# Patient Record
Sex: MALE | Race: WHITE | NOT HISPANIC OR LATINO | Employment: FULL TIME | URBAN - METROPOLITAN AREA
[De-identification: names, ages, dates, MRNs, and addresses within clinical notes are randomized per-mention and may not be internally consistent; named-entity substitution may affect disease eponyms.]

---

## 2024-08-07 ENCOUNTER — OFFICE VISIT (OUTPATIENT)
Dept: PRIMARY CARE CLINIC | Facility: CLINIC | Age: 34
End: 2024-08-07
Payer: COMMERCIAL

## 2024-08-07 VITALS
WEIGHT: 192 LBS | TEMPERATURE: 97 F | RESPIRATION RATE: 16 BRPM | BODY MASS INDEX: 29.1 KG/M2 | HEART RATE: 65 BPM | HEIGHT: 68 IN | OXYGEN SATURATION: 99 % | SYSTOLIC BLOOD PRESSURE: 139 MMHG | DIASTOLIC BLOOD PRESSURE: 79 MMHG

## 2024-08-07 DIAGNOSIS — Z00.00 PREVENTATIVE HEALTH CARE: Primary | ICD-10-CM

## 2024-08-07 DIAGNOSIS — E29.1 HYPOGONADISM MALE: ICD-10-CM

## 2024-08-07 PROCEDURE — 1160F RVW MEDS BY RX/DR IN RCRD: CPT | Mod: CPTII,,, | Performed by: FAMILY MEDICINE

## 2024-08-07 PROCEDURE — 99385 PREV VISIT NEW AGE 18-39: CPT | Mod: ,,, | Performed by: FAMILY MEDICINE

## 2024-08-07 PROCEDURE — 3078F DIAST BP <80 MM HG: CPT | Mod: CPTII,,, | Performed by: FAMILY MEDICINE

## 2024-08-07 PROCEDURE — 3077F SYST BP >= 140 MM HG: CPT | Mod: CPTII,,, | Performed by: FAMILY MEDICINE

## 2024-08-07 PROCEDURE — 3008F BODY MASS INDEX DOCD: CPT | Mod: CPTII,,, | Performed by: FAMILY MEDICINE

## 2024-08-07 PROCEDURE — 1159F MED LIST DOCD IN RCRD: CPT | Mod: CPTII,,, | Performed by: FAMILY MEDICINE

## 2024-08-07 RX ORDER — DIPHENOXYLATE HYDROCHLORIDE AND ATROPINE SULFATE 2.5; .025 MG/5ML; MG/5ML
5 SOLUTION ORAL ONCE
COMMUNITY
Start: 2020-01-07

## 2024-08-07 RX ORDER — DICYCLOMINE HYDROCHLORIDE 10 MG/1
10 CAPSULE ORAL DAILY PRN
COMMUNITY

## 2024-08-07 RX ORDER — ELUXADOLINE 100 MG/1
100 TABLET, FILM COATED ORAL ONCE
COMMUNITY

## 2024-08-08 LAB
%TRANSFERRIN SATURATION: 26.6 % (ref 20–50)
ABS NRBC COUNT: 0 X 10 3/UL (ref 0–0.01)
ABSOLUTE BASOPHIL: 0.03 X 10 3/UL (ref 0–0.22)
ABSOLUTE EOSINOPHIL: 0.38 X 10 3/UL (ref 0.04–0.54)
ABSOLUTE IMMATURE GRAN: 0.01 X 10 3/UL (ref 0–0.04)
ABSOLUTE LYMPHOCYTE: 2.18 X 10 3/UL (ref 0.86–4.75)
ABSOLUTE MONOCYTE: 0.47 X 10 3/UL (ref 0.22–1.08)
ALBUMIN SERPL-MCNC: 4.6 G/DL (ref 3.5–5.2)
ALBUMIN/GLOB SERPL ELPH: 1.8 {RATIO} (ref 1–2.7)
ALP ISOS SERPL LEV INH-CCNC: 67 U/L (ref 40–130)
ALT (SGPT): 22 U/L (ref 0–41)
ANION GAP SERPL CALC-SCNC: 10 MMOL/L (ref 8–17)
AST SERPL-CCNC: 29 U/L (ref 0–40)
BASOPHILS NFR BLD: 0.5 % (ref 0.2–1.2)
BILIRUBIN, TOTAL: 0.92 MG/DL (ref 0–1.2)
BIOAVAILABLE TESTOSTERONE: ABNORMAL
BUN/CREAT SERPL: 21.4 (ref 6–20)
CALCIUM SERPL-MCNC: 9.7 MG/DL (ref 8.6–10.2)
CARBON DIOXIDE, CO2: 25 MMOL/L (ref 22–29)
CHLORIDE: 100 MMOL/L (ref 98–107)
CHOLEST SERPL-MSCNC: 224 MG/DL (ref 100–200)
CREAT SERPL-MCNC: 1.03 MG/DL (ref 0.7–1.2)
EOSINOPHIL NFR BLD: 6.6 % (ref 0.7–7)
ESTIMATED AVERAGE GLUCOSE: 101 MG/DL
FERRITIN: 72.6 NG/ML (ref 20–345)
FREE TESTOSTERONE: NORMAL
GFR ESTIMATION: 99.6 ML/MIN/1.73M2
GLOBULIN: 2.6 G/DL (ref 1.5–4.5)
GLUCOSE: 96 MG/DL (ref 74–106)
HBA1C MFR BLD: 5.1 % (ref 4–6)
HCT VFR BLD AUTO: 47.6 % (ref 42–52)
HDLC SERPL-MCNC: 56 MG/DL
HGB BLD-MCNC: 16.3 G/DL (ref 14–18)
IMMATURE GRANULOCYTES: 0.2 % (ref 0–0.5)
IRON BINDING CAPACITY: 369 UG/DL (ref 262–472)
IRON SERPL-MCNC: 98 UG/DL (ref 59–158)
LDL/HDL RATIO: 2.8 (ref 1–3)
LDLC SERPL CALC-MCNC: 154 MG/DL (ref 0–100)
LYMPHOCYTES NFR BLD: 37.8 % (ref 19.3–53.1)
MCH RBC QN AUTO: 29.3 PG (ref 27–32)
MCHC RBC AUTO-ENTMCNC: 34.2 G/DL (ref 32–36)
MCV RBC AUTO: 85.5 FL (ref 80–94)
MONOCYTES NFR BLD: 8.1 % (ref 4.7–12.5)
NEUTROPHILS # BLD AUTO: 2.7 X 10 3/UL (ref 2.15–7.56)
NEUTROPHILS NFR BLD: 46.8 % (ref 34–71.1)
NUCLEATED RED BLOOD CELLS: 0 /100 WBC (ref 0–0.2)
PLATELET # BLD AUTO: 235 X 10 3/UL (ref 135–400)
POTASSIUM: 4.7 MMOL/L (ref 3.5–5.1)
PROT SNV-MCNC: 7.2 G/DL (ref 6.4–8.3)
RBC # BLD AUTO: 5.57 X 10 6/UL (ref 4.7–6.1)
RDW-SD: 38.5 FL (ref 37–54)
SHBG: 40.2 NMOL/L (ref 16.5–55.9)
SODIUM: 135 MMOL/L (ref 136–145)
T4, FREE: 1.16 NG/DL (ref 0.93–1.7)
TESTOST SERPL-MCNC: >1500 NG/DL (ref 249–836)
TRIGL SERPL-MCNC: 70 MG/DL (ref 0–150)
TSH SERPL DL<=0.005 MIU/L-ACNC: 1.39 UIU/ML (ref 0.27–4.2)
UIBC SERPL-MCNC: 271 UG/DL (ref 112–346)
UREA NITROGEN (BUN): 22 MG/DL (ref 6–20)
WBC # BLD: 5.77 X 10 3/UL (ref 4.3–10.8)

## 2024-08-13 ENCOUNTER — PATIENT MESSAGE (OUTPATIENT)
Dept: PRIMARY CARE CLINIC | Facility: CLINIC | Age: 34
End: 2024-08-13
Payer: COMMERCIAL

## 2024-08-20 DIAGNOSIS — G47.33 OSA (OBSTRUCTIVE SLEEP APNEA): Primary | ICD-10-CM

## 2024-10-02 ENCOUNTER — OFFICE VISIT (OUTPATIENT)
Dept: PULMONOLOGY | Facility: CLINIC | Age: 34
End: 2024-10-02
Payer: COMMERCIAL

## 2024-10-02 VITALS
SYSTOLIC BLOOD PRESSURE: 133 MMHG | BODY MASS INDEX: 28.64 KG/M2 | HEIGHT: 68 IN | WEIGHT: 189 LBS | RESPIRATION RATE: 18 BRPM | HEART RATE: 66 BPM | DIASTOLIC BLOOD PRESSURE: 78 MMHG | OXYGEN SATURATION: 96 %

## 2024-10-02 DIAGNOSIS — R06.83 LOUD SNORING: ICD-10-CM

## 2024-10-02 DIAGNOSIS — F51.04 CHRONIC INSOMNIA: ICD-10-CM

## 2024-10-02 DIAGNOSIS — G47.19 EXCESSIVE DAYTIME SLEEPINESS: Primary | ICD-10-CM

## 2024-10-02 DIAGNOSIS — G47.33 OSA (OBSTRUCTIVE SLEEP APNEA): ICD-10-CM

## 2024-10-02 DIAGNOSIS — K58.9 IRRITABLE BOWEL SYNDROME, UNSPECIFIED TYPE: ICD-10-CM

## 2024-10-02 PROCEDURE — 1159F MED LIST DOCD IN RCRD: CPT | Mod: CPTII,S$GLB,, | Performed by: INTERNAL MEDICINE

## 2024-10-02 PROCEDURE — 3008F BODY MASS INDEX DOCD: CPT | Mod: CPTII,S$GLB,, | Performed by: INTERNAL MEDICINE

## 2024-10-02 PROCEDURE — 3075F SYST BP GE 130 - 139MM HG: CPT | Mod: CPTII,S$GLB,, | Performed by: INTERNAL MEDICINE

## 2024-10-02 PROCEDURE — 99204 OFFICE O/P NEW MOD 45 MIN: CPT | Mod: S$GLB,,, | Performed by: INTERNAL MEDICINE

## 2024-10-02 PROCEDURE — 3078F DIAST BP <80 MM HG: CPT | Mod: CPTII,S$GLB,, | Performed by: INTERNAL MEDICINE

## 2024-10-02 PROCEDURE — 1160F RVW MEDS BY RX/DR IN RCRD: CPT | Mod: CPTII,S$GLB,, | Performed by: INTERNAL MEDICINE

## 2024-10-02 PROCEDURE — 3044F HG A1C LEVEL LT 7.0%: CPT | Mod: CPTII,S$GLB,, | Performed by: INTERNAL MEDICINE

## 2024-10-02 RX ORDER — TESTOSTERONE CYPIONATE 200 MG/ML
INJECTION, SOLUTION INTRAMUSCULAR
COMMUNITY

## 2024-10-02 NOTE — PROGRESS NOTES
Pulmonary Medicine Clinic Note    Patient Identification:   Patient ID: Dave Lee is a 34 y.o. male.    Referring Provider:  Dr. Gisela Sanon     Chief Complaint:  Sleep disordered breathing    History of Present Illness:    Dave Lee is a 34 y.o. male who presents for the evaluation of possible sleep disordered breathing.    Patient says since college age he had trouble staying asleep.  He has been told that he snores loudly.  No witnessed apneas have been reported.  He frequently wakes up in the middle of the night to use restroom.  He complains of excessive daytime sleepiness.  His Helm Sleepiness scale score is 8.  He does have underlying IBS and that frequently causes this trouble.  When he wakes up to use the restroom in the middle of the night, 2-3 times occasionally has trouble going back to sleep.  On average he sleeps about 7 hours.  Goes to bed around 10:00 p.m. and wakes up around 5:00 a.m. with alarm.  If he wakes up in the middle of the night it can be around 2:00 a.m. and on occasions he had trouble going back to sleep throughout the night.  He does not consume any caffeinated beverages or coffee.  He does take naps specially on the weekends which could be up to 1-2 hours in duration.  He does feel better after those naps.  Few years ago his testosterone levels were in 700 and recently was found to be around 300.  He started taking testosterone shots which had helped with sleep.  He is using a sound machine with wide nice which has also helped with his sleep quality.  His last hemoglobin level is above 16.  He was recently found to have little bit higher blood pressure in office setting.  He is currently not on any blood pressure medications.    He denies any upper airway or nasal surgeries.  He socially drinks alcohol but denies any tobacco use.  He does not recall any family history of sleep disordered breathing.  He thinks he has RLS.  He has shaking of his legs during the  day but does not bother him.  This does not happen closer to evening times or bedtimes.  He sleeps in a almost every position and tosses and turns during the night.    His neck circumference is 16 in with a Mallampati class of 2.  His BMI is 28.7 kilos per m2.  His stop Bang score is probably 4 or 5 (for if he is not hypertensive).    Review of Systems:  Review of Systems   Constitutional:  Positive for fatigue. Negative for fever, chills, weight loss, weight gain, activity change, appetite change, night sweats and weakness.   HENT:  Negative for nosebleeds, postnasal drip, rhinorrhea, sinus pressure, sore throat, trouble swallowing, voice change, congestion, ear pain and hearing loss.    Eyes:  Negative for redness and itching.   Respiratory:  Positive for snoring and somnolence. Negative for cough, hemoptysis, sputum production, choking, chest tightness, shortness of breath, wheezing, orthopnea, previous hospitialization due to pulmonary problems, asthma nighttime symptoms, pleurisy, dyspnea on extertion, use of rescue inhaler and Paroxysmal Nocturnal Dyspnea.    Cardiovascular:  Negative for chest pain, palpitations and leg swelling.   Genitourinary:  Negative for difficulty urinating and hematuria.   Endocrine:  Negative for polydipsia, polyphagia, cold intolerance, heat intolerance and polyuria.    Musculoskeletal:  Negative for arthralgias, back pain, gait problem, joint swelling and myalgias.   Skin:  Negative for rash.   Gastrointestinal:  Negative for nausea, vomiting, abdominal pain, abdominal distention and acid reflux.   Neurological:  Negative for dizziness, syncope, weakness, light-headedness and headaches.   Hematological:  Negative for adenopathy. Does not bruise/bleed easily and no excessive bruising.   Psychiatric/Behavioral:  Negative for confusion and sleep disturbance. The patient is not nervous/anxious.          Allergies:   Review of patient's allergies indicates:   Allergen Reactions     Amoxicillin-pot clavulanate Hives     Augmentin       Medications:      Past Medical History:      Past Medical History:   Diagnosis Date    IBS (irritable bowel syndrome)     Other specified noninfective gastroenteritis and colitis        Family History:      Family History   Problem Relation Name Age of Onset    Cancer Mother      Heart disease Father          Social History:      History reviewed. No pertinent surgical history.    Physical Exam:  Vitals:    10/02/24 0807   BP: 133/78   Pulse: 66   Resp: 18     Physical Exam   Constitutional: He is oriented to person, place, and time. He appears not cachectic. No distress.   HENT:   Head: Normocephalic.   Nose: Nose normal. No mucosal edema. No polyps.   Mouth/Throat: Oropharynx is clear and moist. Normal dentition. No oropharyngeal exudate. Mallampati Score: II.   Neck: No JVD present. No tracheal deviation present. No thyromegaly present.   Cardiovascular: Normal rate, regular rhythm, normal heart sounds and intact distal pulses. Exam reveals no gallop and no friction rub.   No murmur heard.  Pulmonary/Chest: Normal expansion, symmetric chest wall expansion, effort normal and breath sounds normal. No stridor. No respiratory distress. He has no decreased breath sounds. He has no wheezes. He has no rhonchi. He has no rales. Chest wall is not dull to percussion. He exhibits no tenderness. Negative for egophony. Negative for tactile fremitus.   Abdominal: Soft. Bowel sounds are normal. He exhibits no distension and no mass. There is no hepatosplenomegaly. There is no abdominal tenderness. There is no rebound and no guarding. No hernia.   Musculoskeletal:         General: No tenderness, deformity or edema. Normal range of motion.      Cervical back: Normal range of motion and neck supple.   Lymphadenopathy: No supraclavicular adenopathy is present.     He has no cervical adenopathy.     He has no axillary adenopathy.   Neurological: He is alert and oriented to  person, place, and time. He displays normal reflexes. No cranial nerve deficit. He exhibits normal muscle tone.   Skin: Skin is warm and dry. No rash noted. He is not diaphoretic. No cyanosis or erythema. No pallor. Nails show no clubbing.   Psychiatric: He has a normal mood and affect. His behavior is normal. Judgment and thought content normal.         Accessory Clinical Data:  Previous Sleep Study findings:  None    ESS of 8    STOPBANG 4-5    Do you snore loudly?  Do you feel tired, fatigued or sleepy during the day?  Has anyone observed that you stop breathing in your sleep?  Do you have or been treated for high blood pressure?  BMI:  Age > 50?  Neck circumference > 40 cm:  Gender:     Assessment and Plan:    Problem List Items Addressed This Visit    None  Visit Diagnoses       Excessive daytime sleepiness    -  Primary    COLEMAN (obstructive sleep apnea)        Relevant Orders    Home Sleep Study    Loud snoring        Chronic insomnia        Irritable bowel syndrome, unspecified type               His sleep maintenance insomnia is probably due to his underlying IBS, however, REM related COLEMAN can not be ruled out.  He does have excessive daytime sleepiness and fatigue which all could be due to IBS as well.  He does snore but no witnessed apneas have been reported.  Taking the naps during the daytime to help.  He does not consume any caffeinated beverages.  His stop Bang score however is elevated.  He may have untreated hypertension as well.  Patient has moderate pre-test probability for mild-to-moderate COLEMAN.     I have discussed with him the pathophysiology of COLEMAN and the harmful cardiovascular and neurocognitive side effects,   morbidity and mortality associated with untreated sleep disordered breathing.  We discussed the available diagnostic and treatment modalities, their merits and demerits in detail.  We discussed the risk factors involved with development of sleep disordered breathing.  I am going to order  a home sleep study, however, he was told that a negative home sleep test will not rule out clinically significant sleep disordered breathing and in later scenario he would need an in-lab polysomnography.  We discussed the treating sleep apnea will be required even in the milder forms as he does have borderline hypertension and mildly elevated hemoglobin which could be due to untreated sleep disordered breathing or testosterone injections.    All the questions were answered. He is agreeable to the above plan.     45 minutes time was spent on this encounter which includes face-to-face time on counseling, performing appropriate examination including risk stratification, assessing daytime sleepiness, risk for sleep disordered breathing, counseling and educating the patient regarding the pathophysiology of obstructive sleep apnea, available diagnostic and treatment modalities, importance of compliance, techniques of desensitization etc., non face-to-face time in reviewing referring physicians notes, ordering appropriate follow-up sleep studies, documenting clinical information in the electronic medical record and care coordination.    Follow up for after sleep study.  Thank you very much for involving me in the care of this patient.  Please do not hesitate to reach me if you have any further questions or concerns.

## 2024-10-17 ENCOUNTER — OUTSIDE PLACE OF SERVICE (OUTPATIENT)
Dept: PULMONOLOGY | Facility: CLINIC | Age: 34
End: 2024-10-17
Payer: COMMERCIAL

## 2024-10-23 PROCEDURE — G0399 HOME SLEEP TEST/TYPE 3 PORTA: HCPCS | Mod: 26,,, | Performed by: INTERNAL MEDICINE

## 2024-10-24 ENCOUNTER — DOCUMENTATION ONLY (OUTPATIENT)
Dept: SLEEP MEDICINE | Facility: HOSPITAL | Age: 34
End: 2024-10-24
Payer: COMMERCIAL

## 2024-12-18 ENCOUNTER — OUTSIDE PLACE OF SERVICE (OUTPATIENT)
Dept: PULMONOLOGY | Facility: CLINIC | Age: 34
End: 2024-12-18
Payer: COMMERCIAL

## 2025-01-02 ENCOUNTER — PATIENT MESSAGE (OUTPATIENT)
Dept: PRIMARY CARE CLINIC | Facility: CLINIC | Age: 35
End: 2025-01-02
Payer: COMMERCIAL

## 2025-01-02 ENCOUNTER — DOCUMENTATION ONLY (OUTPATIENT)
Dept: SLEEP MEDICINE | Facility: HOSPITAL | Age: 35
End: 2025-01-02
Payer: COMMERCIAL

## 2025-01-09 ENCOUNTER — PATIENT MESSAGE (OUTPATIENT)
Dept: PRIMARY CARE CLINIC | Facility: CLINIC | Age: 35
End: 2025-01-09
Payer: COMMERCIAL

## 2025-01-13 DIAGNOSIS — F90.0 ATTENTION DEFICIT HYPERACTIVITY DISORDER (ADHD), PREDOMINANTLY INATTENTIVE TYPE: Primary | ICD-10-CM

## 2025-01-13 RX ORDER — VILOXAZINE HYDROCHLORIDE 150 MG/1
150 CAPSULE, EXTENDED RELEASE ORAL
Status: CANCELLED | OUTPATIENT
Start: 2025-01-13

## 2025-01-14 ENCOUNTER — PATIENT MESSAGE (OUTPATIENT)
Dept: PRIMARY CARE CLINIC | Facility: CLINIC | Age: 35
End: 2025-01-14
Payer: COMMERCIAL

## 2025-01-14 RX ORDER — VILOXAZINE HYDROCHLORIDE 200 MG/1
200 CAPSULE, EXTENDED RELEASE ORAL DAILY
Qty: 90 CAPSULE | Refills: 3 | Status: SHIPPED | OUTPATIENT
Start: 2025-01-14

## 2025-01-15 ENCOUNTER — TELEPHONE (OUTPATIENT)
Dept: PRIMARY CARE CLINIC | Facility: CLINIC | Age: 35
End: 2025-01-15
Payer: COMMERCIAL

## 2025-02-10 ENCOUNTER — PATIENT MESSAGE (OUTPATIENT)
Dept: PRIMARY CARE CLINIC | Facility: CLINIC | Age: 35
End: 2025-02-10
Payer: COMMERCIAL

## 2025-06-19 DIAGNOSIS — N41.9 PROSTATITIS: Primary | ICD-10-CM

## 2025-06-27 ENCOUNTER — OFFICE VISIT (OUTPATIENT)
Dept: UROLOGY | Facility: CLINIC | Age: 35
End: 2025-06-27
Payer: COMMERCIAL

## 2025-06-27 ENCOUNTER — PATIENT MESSAGE (OUTPATIENT)
Dept: UROLOGY | Facility: CLINIC | Age: 35
End: 2025-06-27

## 2025-06-27 VITALS
OXYGEN SATURATION: 98 % | WEIGHT: 175 LBS | DIASTOLIC BLOOD PRESSURE: 70 MMHG | SYSTOLIC BLOOD PRESSURE: 126 MMHG | HEIGHT: 68 IN | BODY MASS INDEX: 26.52 KG/M2 | HEART RATE: 80 BPM

## 2025-06-27 DIAGNOSIS — R31.29 HEMATURIA, MICROSCOPIC: Primary | ICD-10-CM

## 2025-06-27 DIAGNOSIS — N41.0 ACUTE PROSTATITIS: ICD-10-CM

## 2025-06-27 LAB
BILIRUBIN, UA POC OHS: NEGATIVE
BLOOD, UA POC OHS: ABNORMAL
CLARITY, UA POC OHS: CLEAR
COLOR, UA POC OHS: YELLOW
GLUCOSE, UA POC OHS: NEGATIVE
KETONES, UA POC OHS: NEGATIVE
LEUKOCYTES, UA POC OHS: NEGATIVE
NITRITE, UA POC OHS: NEGATIVE
PH, UA POC OHS: 5.5
PROTEIN, UA POC OHS: 100
SPECIFIC GRAVITY, UA POC OHS: 1.02
UROBILINOGEN, UA POC OHS: 0.2

## 2025-06-27 NOTE — PROGRESS NOTES
"Subjective:       Patient ID: Dave Lee is a 34 y.o. male.    Chief Complaint: Prostatitis      HPI: 34-year-old male, new to [facility], referred by primary care.    Patient states that about 2 months ago he had some blood in the urine at work.    He was initially put on antibiotics for about a week for UTI/kidney infection.  He was then put on antibiotics for 3 weeks for prostatitis.    States he had a PSA done in his PSA was 4.0.  Patient states he has not had any blood in his urine since the initial occurrence.  He does complain of some off and on episodes of pain "by find his scrotum".    The pain is about a 2 to 3/10.      At this time he denies any pain or burning urination.  Denies any odor to the urine denies any fever or body aches.  Denies seeing any blood in urine.  Denies any unexpected weight loss.  Denies any significant frequency, urgency, or nocturia.    No other urinary complaints at this time.         Past Medical History:   Past Medical History:   Diagnosis Date    IBS (irritable bowel syndrome)     Other specified noninfective gastroenteritis and colitis        Past Surgical Historical: History reviewed. No pertinent surgical history.     Medications:   Medication List with Changes/Refills   Current Medications    DICYCLOMINE (BENTYL) 10 MG CAPSULE    Take 10 mg by mouth daily as needed.    DIPHENOXYLATE-ATROPINE 2.5-0.025 MG/5 ML (LOMOTIL) 2.5-0.025 MG/5 ML LIQUID    Take 5 mLs by mouth once.    TESTOSTERONE CYPIONATE (DEPOTESTOTERONE CYPIONATE) 200 MG/ML INJECTION    Inject into the muscle every 14 (fourteen) days.    VIBERZI 100 MG TAB    Take 100 mg by mouth once.    VILOXAZINE (QELBREE) 200 MG CP24    Take 1 capsule (200 mg total) by mouth Daily.        Past Social History: Social History[1]    Allergies:   Review of patient's allergies indicates:   Allergen Reactions    Amoxicillin-pot clavulanate Hives     Augmentin        Family History:   Family History   Problem Relation " Name Age of Onset    Cancer Mother      Heart disease Father          Review of Systems:  Review of Systems   Constitutional:  Negative for activity change and appetite change.   HENT:  Negative for congestion and dental problem.    Respiratory:  Negative for chest tightness and shortness of breath.    Cardiovascular:  Negative for chest pain.   Gastrointestinal:  Negative for abdominal distention and abdominal pain.   Genitourinary:  Positive for hematuria. Negative for decreased urine volume, difficulty urinating, dysuria, enuresis, flank pain, frequency, genital sores, penile discharge, penile pain, penile swelling, scrotal swelling, testicular pain and urgency.   Musculoskeletal:  Negative for back pain and neck pain.   Neurological:  Negative for dizziness.   Hematological:  Negative for adenopathy.   Psychiatric/Behavioral:  Negative for agitation, behavioral problems and confusion.        Physical Exam:  Physical Exam  Vitals and nursing note reviewed.   Constitutional:       Appearance: He is well-developed.   HENT:      Head: Normocephalic.   Cardiovascular:      Rate and Rhythm: Normal rate and regular rhythm.      Heart sounds: Normal heart sounds.   Pulmonary:      Effort: Pulmonary effort is normal.      Breath sounds: Normal breath sounds.   Abdominal:      General: Bowel sounds are normal.      Palpations: Abdomen is soft.   Skin:     General: Skin is warm and dry.   Neurological:      Mental Status: He is alert and oriented to person, place, and time.       Urinalysis: Trace intact blood, red blood cells 0-3.  Protein 100.    Assessment/Plan:   1. Acute prostatitis:  Patient has improvement with antibiotics.    States he does have some occasional scrotal pain.  We discussed retreating with antibiotics.  He declines at this time.  We will repeat a PSA at next visit to ensure that it is going down.    2. Microscopic hematuria: Patient has some subjective gross hematuria.  Denies any recurrence since  initial episode.    We discuss possible causes blood in urine.  Discussed concerns of blood in urine.    We discussed workup for blood in urine.  Patient declines at this time.    Will recheck UA at next visit.      Patient follow-up in 4-6 weeks, sooner if needed  Problem List Items Addressed This Visit    None  Visit Diagnoses         Hematuria, microscopic    -  Primary      Acute prostatitis        Relevant Orders    POCT Urinalysis(Instrument) (Completed)                      [1]   Social History  Socioeconomic History    Marital status:    Tobacco Use    Smoking status: Never     Passive exposure: Never    Smokeless tobacco: Never   Vaping Use    Vaping status: Never Used   Substance and Sexual Activity    Drug use: Never     Social Drivers of Health     Food Insecurity: No Food Insecurity (7/5/2024)    Received from [facility] Vital Sign     Worried About Running Out of Food in the Last Year: Never true     Ran Out of Food in the Last Year: Never true

## 2025-08-01 ENCOUNTER — OFFICE VISIT (OUTPATIENT)
Dept: UROLOGY | Facility: CLINIC | Age: 35
End: 2025-08-01
Payer: COMMERCIAL

## 2025-08-01 VITALS — SYSTOLIC BLOOD PRESSURE: 147 MMHG | DIASTOLIC BLOOD PRESSURE: 89 MMHG

## 2025-08-01 DIAGNOSIS — R31.29 HEMATURIA, MICROSCOPIC: Primary | ICD-10-CM

## 2025-08-01 LAB
BILIRUBIN, UA POC OHS: NEGATIVE
BLOOD, UA POC OHS: NEGATIVE
CLARITY, UA POC OHS: CLEAR
COLOR, UA POC OHS: YELLOW
GLUCOSE, UA POC OHS: NEGATIVE
KETONES, UA POC OHS: NEGATIVE
LEUKOCYTES, UA POC OHS: NEGATIVE
NITRITE, UA POC OHS: NEGATIVE
PH, UA POC OHS: 5.5
PROTEIN, UA POC OHS: 100
SPECIFIC GRAVITY, UA POC OHS: 1.02
UROBILINOGEN, UA POC OHS: 0.2

## 2025-08-01 NOTE — PROGRESS NOTES
Subjective:       Patient ID: Dave Lee is a 34 y.o. male.    Chief Complaint: Follow-up      HPI: 34-year-old male, established patient, presents for 1 month follow-up.    Patient has presented 1 month ago with an episode of blood in his urine and prostatitis.    About 3 months ago patient has had visible blood in his urine while at work.    He was initially put on antibiotics for a UTI.  He is then put on antibiotics for 3 weeks for prostatitis.    Patient had reported head not seen any blood in his urine since the initial episode.    At last visit he did have some some trace blood noted.    Patient also had a PSA of 4.0.    At last visit we discuss the concern of blood in his urine.  We discussed workup.  Patient had declined.    Since then he has had a CT done through his primary care provider.  Patient states it CT showed an enlarged prostate but no renal masses.    His PSA was rechecked and per the patient the PSA was 1.83.      Patient should denies any unexpected weight loss.  Denies any new onset bone pain.  Denies any pain or burning urination.  Denies any odor to the urine denies any fever or body aches.  Denies any blood in urine.    No other urinary complaints at this time.         Past Medical History:   Past Medical History:   Diagnosis Date    IBS (irritable bowel syndrome)     Other specified noninfective gastroenteritis and colitis        Past Surgical Historical: History reviewed. No pertinent surgical history.     Medications:   Medication List with Changes/Refills   Current Medications    DICYCLOMINE (BENTYL) 10 MG CAPSULE    Take 10 mg by mouth daily as needed.    DIPHENOXYLATE-ATROPINE 2.5-0.025 MG/5 ML (LOMOTIL) 2.5-0.025 MG/5 ML LIQUID    Take 5 mLs by mouth once.    TESTOSTERONE CYPIONATE (DEPOTESTOTERONE CYPIONATE) 200 MG/ML INJECTION    Inject into the muscle every 14 (fourteen) days.    VIBERZI 100 MG TAB    Take 100 mg by mouth once.    VILOXAZINE (QELBREE) 200 MG CP24    Take 1  capsule (200 mg total) by mouth Daily.        Past Social History: Social History[1]    Allergies:   Review of patient's allergies indicates:   Allergen Reactions    Amoxicillin-pot clavulanate Hives     Augmentin        Family History:   Family History   Problem Relation Name Age of Onset    Cancer Mother      Heart disease Father          Review of Systems:  Review of Systems   Constitutional:  Negative for activity change and appetite change.   HENT:  Negative for congestion and dental problem.    Respiratory:  Negative for chest tightness and shortness of breath.    Cardiovascular:  Negative for chest pain.   Gastrointestinal:  Negative for abdominal distention and abdominal pain.   Genitourinary:  Negative for decreased urine volume, difficulty urinating, dysuria, enuresis, flank pain, frequency, genital sores, hematuria, penile discharge, penile pain, penile swelling, scrotal swelling, testicular pain and urgency.   Musculoskeletal:  Negative for back pain and neck pain.   Neurological:  Negative for dizziness.   Hematological:  Negative for adenopathy.   Psychiatric/Behavioral:  Negative for agitation, behavioral problems and confusion.        Physical Exam:  Physical Exam  Vitals and nursing note reviewed.   Constitutional:       Appearance: He is well-developed.   HENT:      Head: Normocephalic.   Cardiovascular:      Rate and Rhythm: Normal rate and regular rhythm.      Heart sounds: Normal heart sounds.   Pulmonary:      Effort: Pulmonary effort is normal.      Breath sounds: Normal breath sounds.   Abdominal:      General: Bowel sounds are normal.      Palpations: Abdomen is soft.   Skin:     General: Skin is warm and dry.   Neurological:      Mental Status: He is alert and oriented to person, place, and time.       Urinalysis: Protein 100.    Assessment/Plan:   Hematuria: Urinalysis today shows no blood.  He did have a CT through his primary care provider.    We did do a cysto.  Patient declines at this  time.    Patient will notify us for any recurrence of visible blood.    Patient's primary care can refer for microscopic hematuria.    Follow-up as needed.  Problem List Items Addressed This Visit    None  Visit Diagnoses         Hematuria, microscopic    -  Primary    Relevant Orders    POCT Urinalysis(Instrument) (Completed)                      [1]   Social History  Socioeconomic History    Marital status:    Tobacco Use    Smoking status: Never     Passive exposure: Never    Smokeless tobacco: Never   Vaping Use    Vaping status: Never Used   Substance and Sexual Activity    Drug use: Never     Social Drivers of Health     Food Insecurity: No Food Insecurity (7/5/2024)    Received from [facility] Vital Sign     Worried About Running Out of Food in the Last Year: Never true     Ran Out of Food in the Last Year: Never true